# Patient Record
Sex: FEMALE | Race: BLACK OR AFRICAN AMERICAN | NOT HISPANIC OR LATINO | URBAN - METROPOLITAN AREA
[De-identification: names, ages, dates, MRNs, and addresses within clinical notes are randomized per-mention and may not be internally consistent; named-entity substitution may affect disease eponyms.]

---

## 2020-07-25 ENCOUNTER — TELEPHONE ENCOUNTER (OUTPATIENT)
Dept: URBAN - METROPOLITAN AREA CLINIC 13 | Facility: CLINIC | Age: 45
End: 2020-07-25

## 2020-07-26 ENCOUNTER — TELEPHONE ENCOUNTER (OUTPATIENT)
Dept: URBAN - METROPOLITAN AREA CLINIC 13 | Facility: CLINIC | Age: 45
End: 2020-07-26

## 2020-07-26 RX ORDER — FLUCONAZOLE 150 MG/1
TABLET ORAL
Qty: 2 | Refills: 0 | Status: ACTIVE | COMMUNITY
Start: 2013-04-22

## 2020-07-26 RX ORDER — AMOXICILLIN AND CLAVULANATE POTASSIUM 875; 125 MG/1; MG/1
TABLET, FILM COATED ORAL
Qty: 20 | Refills: 0 | Status: ACTIVE | COMMUNITY
Start: 2013-04-22

## 2020-07-26 RX ORDER — DIPHENOXYLATE HYDROCHLORIDE AND ATROPINE SULFATE 2.5; .025 MG/1; MG/1
TABLET ORAL
Qty: 30 | Refills: 0 | Status: ACTIVE | COMMUNITY
Start: 2013-06-24

## 2022-05-05 ENCOUNTER — OFFICE VISIT (OUTPATIENT)
Dept: URBAN - METROPOLITAN AREA MEDICAL CENTER 2 | Facility: MEDICAL CENTER | Age: 47
End: 2022-05-05
Payer: COMMERCIAL

## 2022-05-05 DIAGNOSIS — Z12.11 COLON CANCER SCREENING (HIGH RISK): ICD-10-CM

## 2022-05-05 DIAGNOSIS — D12.8 RECTAL ADENOMA: ICD-10-CM

## 2022-05-05 DIAGNOSIS — K63.5 BENIGN COLON POLYP: ICD-10-CM

## 2022-05-05 PROCEDURE — 45385 COLONOSCOPY W/LESION REMOVAL: CPT | Performed by: INTERNAL MEDICINE

## 2025-06-25 ENCOUNTER — DASHBOARD ENCOUNTERS (OUTPATIENT)
Age: 50
End: 2025-06-25

## 2025-06-25 ENCOUNTER — OFFICE VISIT (OUTPATIENT)
Dept: URBAN - METROPOLITAN AREA CLINIC 113 | Facility: CLINIC | Age: 50
End: 2025-06-25
Payer: COMMERCIAL

## 2025-06-25 DIAGNOSIS — K59.09 CHRONIC CONSTIPATION: ICD-10-CM

## 2025-06-25 DIAGNOSIS — K62.5 RECTAL BLEEDING: ICD-10-CM

## 2025-06-25 PROCEDURE — 99204 OFFICE O/P NEW MOD 45 MIN: CPT

## 2025-06-25 RX ORDER — SEMAGLUTIDE 1.34 MG/ML
INJECT 1MG UNDER THE SKIN EVERY WEEK INJECTION, SOLUTION SUBCUTANEOUS
Qty: 3 UNSPECIFIED | Refills: 11 | Status: ACTIVE | COMMUNITY

## 2025-06-25 RX ORDER — DIPHENOXYLATE HYDROCHLORIDE AND ATROPINE SULFATE 2.5; .025 MG/1; MG/1
TABLET ORAL
Qty: 30 | Refills: 0 | Status: ON HOLD | COMMUNITY
Start: 2013-06-24

## 2025-06-25 RX ORDER — FLUCONAZOLE 150 MG/1
TABLET ORAL
Qty: 2 | Refills: 0 | Status: ON HOLD | COMMUNITY
Start: 2013-04-22

## 2025-06-25 RX ORDER — AMOXICILLIN AND CLAVULANATE POTASSIUM 875; 125 MG/1; MG/1
TABLET, FILM COATED ORAL
Qty: 20 | Refills: 0 | Status: ON HOLD | COMMUNITY
Start: 2013-04-22

## 2025-07-10 ENCOUNTER — OFFICE VISIT (OUTPATIENT)
Dept: URBAN - METROPOLITAN AREA MEDICAL CENTER 43 | Facility: MEDICAL CENTER | Age: 50
End: 2025-07-10
Payer: COMMERCIAL

## 2025-07-10 DIAGNOSIS — K92.1 HEMATOCHEZIA: ICD-10-CM

## 2025-07-10 DIAGNOSIS — D12.5 ADENOMA OF SIGMOID COLON: ICD-10-CM

## 2025-07-10 PROCEDURE — 45385 COLONOSCOPY W/LESION REMOVAL: CPT | Performed by: INTERNAL MEDICINE

## 2025-07-10 RX ORDER — AMOXICILLIN AND CLAVULANATE POTASSIUM 875; 125 MG/1; MG/1
TABLET, FILM COATED ORAL
Qty: 20 | Refills: 0 | Status: ON HOLD | COMMUNITY
Start: 2013-04-22

## 2025-07-10 RX ORDER — FLUCONAZOLE 150 MG/1
TABLET ORAL
Qty: 2 | Refills: 0 | Status: ON HOLD | COMMUNITY
Start: 2013-04-22

## 2025-07-10 RX ORDER — DIPHENOXYLATE HYDROCHLORIDE AND ATROPINE SULFATE 2.5; .025 MG/1; MG/1
TABLET ORAL
Qty: 30 | Refills: 0 | Status: ON HOLD | COMMUNITY
Start: 2013-06-24

## 2025-07-10 RX ORDER — SEMAGLUTIDE 1.34 MG/ML
INJECT 1MG UNDER THE SKIN EVERY WEEK INJECTION, SOLUTION SUBCUTANEOUS
Qty: 3 UNSPECIFIED | Refills: 11 | Status: ACTIVE | COMMUNITY

## 2025-07-15 NOTE — HPI-TODAY'S VISIT:
50-year-old female presents for follow-up after ER visit.  She is known to Dr. Alfonso.  She was originally seen in 2013 for diarrhea which resolved after antibiotics suggesting an infectious process.  She had a screening colonoscopy performed in May 2022.  This revealed normal TI and two 4 to 5 mm polyps in the rectum and cecum.  Pathology returned as benign lymphoid aggregates and tubular adenoma. She was seen at [facility] on 5/24/2025 with complaints of bleeding hemorrhoids.  Labs showed hemoglobin 11.3, hematocrit 33.9.  MCV 90.2 she was recommended sitz bath's and Tucks pads and a follow-up with GI.  She believes she devloped the hemorrhoids a year ago during a strainoud exercise. her twin sister and father had her hemorrhoids removed. SHe has bleeding with almost every bowel movement. She takes Miralax . Even if she does have loose stools she will bleed. It is bright red blood. THis is also clotted. It does fill the toilet bowl. It does leak onto her underwear. She does have rectal soreness. SHe developed RLQ pain over the last two days that radiates to the bacl. It comes and goes. It does improve with defecation. Denies fevers or chills. SHe has lost intentional weight on Ozempic. SHe has been on Ozempic for over years. She has been more constipated over the last year. Pt arrives to the ED via walk in from home. Reports increase vaginal discharge and itchiness. Hx of yeast infections.